# Patient Record
Sex: MALE
[De-identification: names, ages, dates, MRNs, and addresses within clinical notes are randomized per-mention and may not be internally consistent; named-entity substitution may affect disease eponyms.]

---

## 2017-11-24 ENCOUNTER — RX RENEWAL (OUTPATIENT)
Age: 27
End: 2017-11-24

## 2018-04-16 ENCOUNTER — EMERGENCY (EMERGENCY)
Facility: HOSPITAL | Age: 28
LOS: 1 days | Discharge: HOME | End: 2018-04-16
Attending: EMERGENCY MEDICINE | Admitting: EMERGENCY MEDICINE
Payer: OTHER MISCELLANEOUS

## 2018-04-16 VITALS
WEIGHT: 169.98 LBS | TEMPERATURE: 99 F | RESPIRATION RATE: 18 BRPM | HEART RATE: 74 BPM | DIASTOLIC BLOOD PRESSURE: 74 MMHG | SYSTOLIC BLOOD PRESSURE: 118 MMHG | OXYGEN SATURATION: 100 % | HEIGHT: 66 IN

## 2018-04-16 VITALS — TEMPERATURE: 98 F | HEART RATE: 77 BPM | DIASTOLIC BLOOD PRESSURE: 87 MMHG | SYSTOLIC BLOOD PRESSURE: 151 MMHG

## 2018-04-16 LAB
APPEARANCE UR: CLEAR — SIGNIFICANT CHANGE UP
BILIRUB UR-MCNC: NEGATIVE — SIGNIFICANT CHANGE UP
COLOR SPEC: YELLOW — SIGNIFICANT CHANGE UP
DIFF PNL FLD: NEGATIVE — SIGNIFICANT CHANGE UP
GLUCOSE UR QL: NEGATIVE — SIGNIFICANT CHANGE UP
KETONES UR-MCNC: NEGATIVE — SIGNIFICANT CHANGE UP
LEUKOCYTE ESTERASE UR-ACNC: NEGATIVE — SIGNIFICANT CHANGE UP
NITRITE UR-MCNC: NEGATIVE — SIGNIFICANT CHANGE UP
PH UR: 6 — SIGNIFICANT CHANGE UP (ref 5–8)
PROT UR-MCNC: NEGATIVE MG/DL — SIGNIFICANT CHANGE UP
SP GR SPEC: <=1.005 — SIGNIFICANT CHANGE UP (ref 1–1.03)
UROBILINOGEN FLD QL: 0.2 E.U./DL — SIGNIFICANT CHANGE UP

## 2018-04-16 PROCEDURE — 81003 URINALYSIS AUTO W/O SCOPE: CPT

## 2018-04-16 PROCEDURE — 76705 ECHO EXAM OF ABDOMEN: CPT

## 2018-04-16 PROCEDURE — 71046 X-RAY EXAM CHEST 2 VIEWS: CPT

## 2018-04-16 PROCEDURE — 99284 EMERGENCY DEPT VISIT MOD MDM: CPT | Mod: 25

## 2018-04-16 PROCEDURE — 76705 ECHO EXAM OF ABDOMEN: CPT | Mod: 26

## 2018-04-16 PROCEDURE — 93005 ELECTROCARDIOGRAM TRACING: CPT

## 2018-04-16 PROCEDURE — 73010 X-RAY EXAM OF SHOULDER BLADE: CPT | Mod: 26,LT

## 2018-04-16 PROCEDURE — 72125 CT NECK SPINE W/O DYE: CPT | Mod: 26

## 2018-04-16 PROCEDURE — 73080 X-RAY EXAM OF ELBOW: CPT | Mod: 26,LT

## 2018-04-16 PROCEDURE — 72170 X-RAY EXAM OF PELVIS: CPT

## 2018-04-16 PROCEDURE — 70450 CT HEAD/BRAIN W/O DYE: CPT

## 2018-04-16 PROCEDURE — 73080 X-RAY EXAM OF ELBOW: CPT

## 2018-04-16 PROCEDURE — 72128 CT CHEST SPINE W/O DYE: CPT | Mod: 26

## 2018-04-16 PROCEDURE — 72125 CT NECK SPINE W/O DYE: CPT

## 2018-04-16 PROCEDURE — 73130 X-RAY EXAM OF HAND: CPT | Mod: 26,LT

## 2018-04-16 PROCEDURE — 72128 CT CHEST SPINE W/O DYE: CPT

## 2018-04-16 PROCEDURE — 71046 X-RAY EXAM CHEST 2 VIEWS: CPT | Mod: 26

## 2018-04-16 PROCEDURE — 70450 CT HEAD/BRAIN W/O DYE: CPT | Mod: 26

## 2018-04-16 PROCEDURE — 73130 X-RAY EXAM OF HAND: CPT

## 2018-04-16 PROCEDURE — 73010 X-RAY EXAM OF SHOULDER BLADE: CPT

## 2018-04-16 PROCEDURE — 93010 ELECTROCARDIOGRAM REPORT: CPT

## 2018-04-16 PROCEDURE — 72170 X-RAY EXAM OF PELVIS: CPT | Mod: 26

## 2018-04-16 RX ORDER — IBUPROFEN 200 MG
800 TABLET ORAL ONCE
Qty: 0 | Refills: 0 | Status: COMPLETED | OUTPATIENT
Start: 2018-04-16 | End: 2018-04-16

## 2018-04-16 RX ORDER — IBUPROFEN 200 MG
1 TABLET ORAL
Qty: 20 | Refills: 0 | OUTPATIENT
Start: 2018-04-16

## 2018-04-16 RX ADMIN — Medication 800 MILLIGRAM(S): at 13:06

## 2018-04-16 NOTE — ED PROVIDER NOTE - PHYSICAL EXAMINATION
VITAL SIGNS: I have reviewed nursing notes and confirm.  CONSTITUTIONAL: Well-developed; well-nourished; in no acute distress.   SKIN:  warm and dry, no acute rash.   HEAD:  normocephalic, atraumatic.  EYES: PERRL, EOM intact; conjunctiva and sclera clear.  ENT: No nasal discharge; airway clear.   NECK: Supple; non tender midline/ paraspinous.  CARD: S1, S2 normal; no murmurs, gallops, or rubs. Regular rate and rhythm.   RESP:  Clear to auscultation b/l, no wheezes, rales or rhonchi.  ABD: Normal bowel sounds; soft; non-distended; non-tender; no guarding/ rebound.  BACK: + mild ttp to upper thoracic spine; no ttp to lower thoracic or lumbar spine. No cvat. No ecchymosis/ abrasions. + ttp to left scapula. No crepitus.   EXT: Pelvis stable to rocking. No hip/ lower ext tenderness. LUE: + ttp to lateral epicondyle only. Also with mild ttp to 3rd mcp, no deformities noted. No scaphoid tenderness. Normal ROM at all jts.  2+ pulses to b/l ue/le.  NEURO: Alert, oriented x 3, CN II-XII grossly unremarkable. motor/ sensation intact and equal b/l.   PSYCH: Cooperative, mood and affect appropriate.

## 2018-04-16 NOTE — ED PROVIDER NOTE - MEDICAL DECISION MAKING DETAILS
Impression: s/p fall of approx 5-6 feet from ladder. Neurologically intact. HDS. CT head/ c-t spine neg for acute injury. Xrays of left scapula, hand, chest, and pelvis neg for acute injury; xray of left elbow + for ant and post fat pad, though no discrete fx. Will place in sling for possible underlying radial head fx. Pt to f/u with ortho for re-evaluation and further imaging as needed.

## 2018-04-16 NOTE — ED ADULT NURSE NOTE - CHPI ED SYMPTOMS NEG
no bleeding/no fever/no loss of consciousness/no vomiting/CP, SOB, neck pain, back pain, abd pain, HA, nausea/no abrasion/no confusion/no weakness/no deformity

## 2018-04-16 NOTE — ED ADULT TRIAGE NOTE - CHIEF COMPLAINT QUOTE
Pt BIBA from workplace s/p fall from ladder and CO Left sided pain.  Per EMS, pt has known Hx of Vertigo which caused fall from half way up the ladder, aprox 4 ft.  Pos (+) Helmet use.  Pt denies LOC, dizziness, N/V/D, SOB, Fevers and CP upon arrival to ED.  EKG in progress.

## 2018-04-16 NOTE — ED PROVIDER NOTE - OBJECTIVE STATEMENT
Pt is a 28yo m, no pmh, who presents s/p fall approx 5-6 ft from ladder today. Pt was putting stud into wall, reached left to grab something and ladder tipped over. Pt landed on left side of body, predominantly lue. + mild head trauma, no loc, was wearing helmet. + dizziness afterwards. + pain to left scapula, elbow, which is worse w/ movement of lue. No n/t/w, nausea, vomiting, visual changes, neck/ back pain, cp, sob, abd pain, hip/ lower ext pain. [place] [facility] [place]

## 2018-04-20 DIAGNOSIS — S09.90XA UNSPECIFIED INJURY OF HEAD, INITIAL ENCOUNTER: ICD-10-CM
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
## 2018-04-20 DIAGNOSIS — S20.222A CONTUSION OF LEFT BACK WALL OF THORAX, INITIAL ENCOUNTER: ICD-10-CM

## 2018-04-20 DIAGNOSIS — S40.012A CONTUSION OF LEFT SHOULDER, INITIAL ENCOUNTER: ICD-10-CM
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
## 2018-04-20 DIAGNOSIS — S50.02XA CONTUSION OF LEFT ELBOW, INITIAL ENCOUNTER: ICD-10-CM
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
## 2018-04-20 DIAGNOSIS — S60.032A CONTUSION OF LEFT MIDDLE FINGER WITHOUT DAMAGE TO NAIL, INITIAL ENCOUNTER: ICD-10-CM
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
